# Patient Record
Sex: MALE | Race: WHITE | NOT HISPANIC OR LATINO | URBAN - METROPOLITAN AREA
[De-identification: names, ages, dates, MRNs, and addresses within clinical notes are randomized per-mention and may not be internally consistent; named-entity substitution may affect disease eponyms.]

---

## 2017-02-13 ENCOUNTER — IMPORTED ENCOUNTER (OUTPATIENT)
Dept: URBAN - METROPOLITAN AREA CLINIC 50 | Facility: CLINIC | Age: 82
End: 2017-02-13

## 2017-02-13 NOTE — PATIENT DISCUSSION
"""OCT TODAY"" ""Follow dry ARMD without treatment. MVI/AREDS/Amsler. Patient to call if vision changes or distortion increases. Good diet/do not smoke. """

## 2017-08-14 ENCOUNTER — IMPORTED ENCOUNTER (OUTPATIENT)
Dept: URBAN - METROPOLITAN AREA CLINIC 50 | Facility: CLINIC | Age: 82
End: 2017-08-14

## 2018-02-12 ENCOUNTER — IMPORTED ENCOUNTER (OUTPATIENT)
Dept: URBAN - METROPOLITAN AREA CLINIC 50 | Facility: CLINIC | Age: 83
End: 2018-02-12

## 2018-08-27 ENCOUNTER — IMPORTED ENCOUNTER (OUTPATIENT)
Dept: URBAN - METROPOLITAN AREA CLINIC 50 | Facility: CLINIC | Age: 83
End: 2018-08-27

## 2019-02-05 ENCOUNTER — IMPORTED ENCOUNTER (OUTPATIENT)
Dept: URBAN - METROPOLITAN AREA CLINIC 50 | Facility: CLINIC | Age: 84
End: 2019-02-05

## 2019-02-25 ENCOUNTER — IMPORTED ENCOUNTER (OUTPATIENT)
Dept: URBAN - METROPOLITAN AREA CLINIC 50 | Facility: CLINIC | Age: 84
End: 2019-02-25

## 2019-08-26 ENCOUNTER — IMPORTED ENCOUNTER (OUTPATIENT)
Dept: URBAN - METROPOLITAN AREA CLINIC 50 | Facility: CLINIC | Age: 84
End: 2019-08-26

## 2021-04-17 ASSESSMENT — VISUAL ACUITY
OD_BAT: 20/30
OS_CC: J1+@ 15 IN
OS_SC: 20/25
OD_OTHER: 20/25-2. 20/40.
OD_BAT: 20/30
OD_SC: 20/30+2
OS_CC: J1+
OS_BAT: 20/30-1
OD_BAT: 20/25-2
OS_BAT: 20/30
OD_BAT: 20/25-2
OD_BAT: 20/30
OD_CC: J1+@ 15 IN
OD_SC: 20/20-2
OS_SC: 20/25-2
OD_CC: J1+16IN
OS_OTHER: 20/40. 20/50.
OS_OTHER: 20/30-1. 20/40.
OD_CC: J1+
OS_OTHER: 20/30. 20/30.
OS_SC: 20/30
OD_SC: 20/25-
OS_BAT: 20/40
OD_SC: 20/30+2
OS_SC: 20/30+1
OS_CC: J1+
OS_BAT: 20/40-
OS_CC: J1+16IN
OS_SC: 20/20-2
OS_OTHER: 20/40. 20/40-.
OD_OTHER: 20/30. 20/40.
OD_SC: 20/30
OS_OTHER: 20/40-. 20/50.
OD_SC: 20/30-1/+1
OD_OTHER: 20/25-2. 20/30.
OD_OTHER: 20/30. 20/40.
OD_OTHER: 20/30. 20/40-.
OS_BAT: 20/40
OS_SC: 20/30++
OD_CC: J1+

## 2021-04-17 ASSESSMENT — TONOMETRY
OS_IOP_MMHG: 11
OD_IOP_MMHG: 12
OS_IOP_MMHG: 10
OS_IOP_MMHG: 11
OD_IOP_MMHG: 14
OS_IOP_MMHG: 15
OD_IOP_MMHG: 10
OD_IOP_MMHG: 8
OD_IOP_MMHG: 6
OD_IOP_MMHG: 7
OD_IOP_MMHG: 10
OS_IOP_MMHG: 12
OD_IOP_MMHG: 11
OS_IOP_MMHG: 14
OS_IOP_MMHG: 8
OS_IOP_MMHG: 8
OD_IOP_MMHG: 10
OS_IOP_MMHG: 7
OD_IOP_MMHG: 14
OS_IOP_MMHG: 12

## 2021-04-17 ASSESSMENT — PACHYMETRY
OD_CT_UM: 703
OS_CT_UM: 694
OD_CT_UM: 703
OD_CT_UM: 703
OS_CT_UM: 694
OS_CT_UM: 694
OD_CT_UM: 703
OS_CT_UM: 694